# Patient Record
Sex: MALE
[De-identification: names, ages, dates, MRNs, and addresses within clinical notes are randomized per-mention and may not be internally consistent; named-entity substitution may affect disease eponyms.]

---

## 2021-10-07 ENCOUNTER — HOSPITAL ENCOUNTER (EMERGENCY)
Dept: HOSPITAL 97 - ER | Age: 46
Discharge: HOME | End: 2021-10-07
Payer: SELF-PAY

## 2021-10-07 VITALS — SYSTOLIC BLOOD PRESSURE: 123 MMHG | DIASTOLIC BLOOD PRESSURE: 85 MMHG | OXYGEN SATURATION: 98 %

## 2021-10-07 VITALS — TEMPERATURE: 97.1 F

## 2021-10-07 DIAGNOSIS — U07.1: Primary | ICD-10-CM

## 2021-10-07 PROCEDURE — 99284 EMERGENCY DEPT VISIT MOD MDM: CPT

## 2021-10-07 PROCEDURE — 96372 THER/PROPH/DIAG INJ SC/IM: CPT

## 2021-10-07 NOTE — ER
Nurse's Notes                                                                                     

 [facility]                                                                 

Name: Freddie Hamilton                                                                              

Age: 46 yrs                                                                                       

Sex: Male                                                                                         

: 1975                                                                                   

MRN: [id number]                                                                                   

Arrival Date: 10/07/2021                                                                          

Time: 08:03                                                                                       

Account#: [id number]                                                                            

Bed 5                                                                                             

Private MD:                                                                                       

Diagnosis: Coronavirus infection, unspecified                                                     

                                                                                                  

Presentation:                                                                                     

10/07                                                                                             

08:16 Chief complaint: Patient states: Covid positive for 10 days, shortness of breath with   jl7 

      coughing, denies fever. Coronavirus screen: Vaccine status: Patient reports being           

      unvaccinated. cough unrelated to allergies, shortness of breath, Client reports             

      previous positive COVID test result. 10 days. Ebola Screen: No symptoms or risks            

      identified at this time. Initial Sepsis Screen: Does the patient meet any 2 criteria?       

      No. Patient's initial sepsis screen is negative. Does the patient have a suspected          

      source of infection? No. Patient's initial sepsis screen is negative. Risk Assessment:      

      Do you want to hurt yourself or someone else? Patient reports no desire to harm self or     

      others. Onset of symptoms is unknown. Care prior to arrival: None.                          

08:16 Method Of Arrival: Ambulatory                                                           [facility] 

08:16 Acuity: GIL 3                                                                           jl7 

                                                                                                  

Triage Assessment:                                                                                

08:17 General: Appears in no apparent distress. uncomfortable, Behavior is calm, cooperative, jl7 

      appropriate for age. Pain: Denies pain. Neuro: Level of Consciousness is awake, alert,      

      obeys commands, Oriented to person, place, time. Cardiovascular: Patient's skin is warm     

      and dry. Respiratory: Airway is patent Respiratory effort is even, unlabored,               

      Respiratory pattern is symmetrical, tachypnea. Derm: Skin is pink, warm \T\ dry.            

                                                                                                  

Historical:                                                                                       

- Allergies:                                                                                      

08:17 No Known Allergies;                                                                     jl7 

- Home Meds:                                                                                      

08:17 None [Active];                                                                          jl7 

- PMHx:                                                                                           

08:17 None;                                                                                   jl7 

- PSHx:                                                                                           

08:17 None;                                                                                   jl7 

                                                                                                  

- Immunization history:: Adult Immunizations not up to date, Client reports having NOT            

  received the Covid vaccine.                                                                     

- Social history:: Smoking status: unknown.                                                       

                                                                                                  

                                                                                                  

Screenin:20 Abuse screen: Denies threats or abuse. Denies injuries from another. Nutritional        bp  

      screening: No deficits noted. Tuberculosis screening: No symptoms or risk factors           

      identified. Fall Risk None identified.                                                      

                                                                                                  

Assessment:                                                                                       

08:20 General: SEE TRIAGE NOTE.                                                               bp  

09:38 Reassessment: Patient is alert, oriented x 3, equal unlabored respirations, skin        aa5 

      warm/dry/pink.                                                                              

                                                                                                  

Vital Signs:                                                                                      

08:16  / 82; Pulse 93; Resp 23; Temp 97.1; Pulse Ox 96% on R/A;                         jl7 

08:21  / 85; Pulse 93; Resp 17; Pulse Ox 98% ;                                          bp  

09:35  / 75; Pulse 88; Resp 20 S; Pulse Ox 98% on R/A;                                  aa5 

                                                                                                  

ED Course:                                                                                        

08:03 Patient arrived in ED.                                                                  as  

08:06 Herb Talbot PA is PHCP.                                                              geronimo 

08:06 Smith Gomez MD is Attending Physician.                                           geronimo 

08:11 Attending Physician role handed off by Smith Gomze MD cha 

08:11 Jaden Beltran MD is Attending Physician.                                             [facility] 

08:17 Triage completed.                                                                       jl7 

08:17 Arm band placed on right wrist.                                                         jl7 

08:20 Patient has correct armband on for positive identification. Bed in low position. Call   bp  

      light in reach. Side rails up X2.                                                           

08:21 Christopher Perez, RN is Primary Nurse.                                                    bp  

09:38 No provider procedures requiring assistance completed. Patient did not have IV access   aa5 

      during this emergency room visit.                                                           

                                                                                                  

Administered Medications:                                                                         

08:30 Drug: Decadron (dexamethasone) 10 mg Route: IM; Site: left deltoid;                     bp  

09:38 Follow up: Response: No adverse reaction                                                aa5 

08:30 Drug: Albuterol HFA Inhaler 2 puffs Route: Inhalation;                                  bp  

09:38 Follow up: Response: No adverse reaction                                                aa5 

                                                                                                  

                                                                                                  

Outcome:                                                                                          

08:44 Discharge ordered by MD.                                                                jm 

09:38 Discharged to home ambulatory.                                                          aa5 

09:38 Condition: stable                                                                           

09:38 Discharge instructions given to patient, Instructed on discharge instructions, follow       

      up and referral plans. medication usage, Demonstrated understanding of instructions,        

      follow-up care, medications, Prescriptions given X 1.                                       

09:42 Patient left the ED.                                                                    aa5 

                                                                                                  

Signatures:                                                                                       

Jaden Beltran MD MD cha Mickail, Joel, PA PA jmm Martinez, Amelia as Calderon, Audri, RN                     RN   aa5                                                  

Anamika Pagan RN RN   jl7                                                  

Christopher Perez, DENISE                      RN   bp                                                   

                                                                                                  

**************************************************************************************************

## 2021-10-07 NOTE — EDPHYS
Physician Documentation                                                                           

 [facility]                                                                 

Name: Freddie Hamilton                                                                              

Age: 46 yrs                                                                                       

Sex: Male                                                                                         

: 1975                                                                                   

MRN: [id number]                                                                                   

Arrival Date: 10/07/2021                                                                          

Time: 08:03                                                                                       

Account#: [id number]                                                                            

Bed 5                                                                                             

Private MD:                                                                                       

PADMINI Physician Jaden Beltran                                                                      

HPI:                                                                                              

10/07                                                                                             

08:19 This 46 yrs old  Male presents to ER via Ambulatory with complaints of covid+.  jmm 

08:19 The patient has shortness of breath at rest. Onset: The symptoms/episode began/occurred jmm 

      gradually, 10 day(s) ago. Duration: The symptoms are continuous. The patient's              

      shortness of breath is aggravated by nothing, is alleviated by nothing. Associated          

      signs and symptoms: Pertinent positives: non-productive cough. The patient has not          

      experienced similar symptoms in the past.                                                   

                                                                                                  

Historical:                                                                                       

- Allergies:                                                                                      

08:17 No Known Allergies;                                                                     jl7 

- Home Meds:                                                                                      

08:17 None [Active];                                                                          jl7 

- PMHx:                                                                                           

08:17 None;                                                                                   jl7 

- PSHx:                                                                                           

08:17 None;                                                                                   jl7 

                                                                                                  

- Immunization history:: Adult Immunizations not up to date, Client reports having NOT            

  received the Covid vaccine.                                                                     

- Social history:: Smoking status: unknown.                                                       

                                                                                                  

                                                                                                  

ROS:                                                                                              

08:19 Constitutional: Negative for fever, chills, and weight loss, Cardiovascular: Negative   jm 

      for chest pain, palpitations, and edema.                                                    

08:19 Respiratory: Positive for cough, shortness of breath.                                       

08:19 All other systems are negative.                                                             

                                                                                                  

Exam:                                                                                             

08:19 Constitutional:  This is a well developed, well nourished patient who is awake, alert,  jmm 

      and in no acute distress. Head/Face:  atraumatic. Eyes:  EOMI, no conjunctival erythema     

      appreciated ENT:  Moist Mucus Membranes Neck:  Trachea midline, Supple Chest/axilla:        

      Normal chest wall appearance and motion.   Cardiovascular:  Regular rate and rhythm.        

      No edema appreciated                                                                        

08:19 Abdomen/GI:  Non distended, soft Back:  Normal ROM Skin:  General appearance color          

      normal                                                                                      

08:19 Respiratory: the patient does not display signs of respiratory distress,  Respirations:     

      normal, Breath sounds: wheezing: that is mild, is scattered.                                

08:19 Musculoskeletal/extremity: ROM: intact in all extremities.                                  

08:19 Skin: Appearance: Color: normal in color.                                                   

08:19 Neuro: Motor: is normal.                                                                    

08:19 Psych: Behavior/mood is pleasant, cooperative.                                              

                                                                                                  

Vital Signs:                                                                                      

08:16  / 82; Pulse 93; Resp 23; Temp 97.1; Pulse Ox 96% on R/A;                         jl7 

08:21  / 85; Pulse 93; Resp 17; Pulse Ox 98% ;                                          bp  

09:35  / 75; Pulse 88; Resp 20 S; Pulse Ox 98% on R/A;                                  aa5 

                                                                                                  

MDM:                                                                                              

08:17 Patient medically screened.                                                             [facility] 

08:42 Data reviewed: vital signs, nurses notes. Counseling: I had a detailed discussion with  geronimo 

      the patient and/or guardian regarding: the historical points, exam findings, and any        

      diagnostic results supporting the discharge/admit diagnosis, lab results, radiology         

      results, the need for outpatient follow up, to return to the emergency department if        

      symptoms worsen or persist or if there are any questions or concerns that arise at          

      home. ED course: Patient is alert and patient is alert nontoxic in appearance in the        

      ED. No hypoxia appreciated. Do not suspect pulmonary embolism. Patient advised to           

      return to the ED if he does develop worsening symptoms. Patient does not qualify for        

      monoclonal antibodies due to timeframe and no comorbidities.                                

                                                                                                  

Administered Medications:                                                                         

08:30 Drug: Decadron (dexamethasone) 10 mg Route: IM; Site: left deltoid;                     bp  

09:38 Follow up: Response: No adverse reaction                                                aa5 

08:30 Drug: Albuterol HFA Inhaler 2 puffs Route: Inhalation;                                  bp  

09:38 Follow up: Response: No adverse reaction                                                aa5 

                                                                                                  

                                                                                                  

Disposition Summary:                                                                              

10/07/21 08:44                                                                                    

Discharge Ordered                                                                                 

      Location: Home                                                                          [facility] 

      Condition: Stable                                                                       [facility] 

      Diagnosis                                                                                   

        - Coronavirus infection, unspecified                                                  [facility] 

      Followup:                                                                               [facility] 

        - With: Private Physician                                                                  

        - When: 2 - 3 days                                                                         

        - Reason: Recheck today's complaints, Continuance of care, Re-evaluation by your           

      physician                                                                                   

      Discharge Instructions:                                                                     

        - Discharge Summary Sheet                                                             [facility] 

        - COVID-19                                                                            [facility] 

      Forms:                                                                                      

        - Medication Reconciliation Form                                                      [facility] 

        - Thank You Letter                                                                    [facility] 

        - Antibiotic Education                                                                [facility] 

        - Prescription Opioid Use                                                             [facility] 

      Prescriptions:                                                                              

        - albuterol sulfate 90 mcg/actuation Inhalation HFA aerosol inhaler                        

            - inhale 2 puff by INHALATION route every 4 hours; 1 Pump; Refills: 0, Product    [facility] 

      Selection Permitted                                                                         

Addendum:                                                                                         

10/11/2021                                                                                        

     06:52 Co-signature as Attending Physician, Jaden Devin MD I agree with the assessment and  c
ha

           plan of care.                                                                          

                                                                                                  

Signatures:                                                                                       

Jaden Beltran MD MD cha Mickail, Joel, PA PA jmm Leal, Jahala RN                        RN   jl7                                                  

Christopher Perez RN                      RN   bp                                                   

Shaina Salazar                         RN   aa5                                                  

                                                                                                  

**************************************************************************************************